# Patient Record
Sex: FEMALE
[De-identification: names, ages, dates, MRNs, and addresses within clinical notes are randomized per-mention and may not be internally consistent; named-entity substitution may affect disease eponyms.]

---

## 2018-12-26 LAB
ALBUMIN SERPL-MCNC: 3.8 G/DL (ref 3.5–5)
ALBUMIN/GLOB SERPL: 1.1 {RATIO} (ref 0.8–2)
ALP SERPL-CCNC: 82 IU/L (ref 40–150)
ALT SERPL-CCNC: 13 IU/L (ref 0–55)
ANION GAP SERPL CALC-SCNC: 14 MMOL/L (ref 8–16)
BASOPHILS # BLD AUTO: 0.1 10*3/UL (ref 0–0.1)
BASOPHILS NFR BLD AUTO: 1 % (ref 0–1)
BUN SERPL-MCNC: 11 MG/DL (ref 7–26)
BUN/CREAT SERPL: 14 (ref 6–25)
CALCIUM SERPL-MCNC: 9.6 MG/DL (ref 8.4–10.2)
CHLORIDE SERPL-SCNC: 100 MMOL/L (ref 98–107)
CO2 SERPL-SCNC: 29 MMOL/L (ref 22–29)
DEPRECATED NEUTROPHILS # BLD AUTO: 4.1 10*3/UL (ref 2.1–6.9)
EGFRCR SERPLBLD CKD-EPI 2021: > 60 ML/MIN (ref 60–?)
EOSINOPHIL # BLD AUTO: 0.1 10*3/UL (ref 0–0.4)
EOSINOPHIL NFR BLD AUTO: 2 % (ref 0–6)
ERYTHROCYTE [DISTWIDTH] IN CORD BLOOD: 12.9 % (ref 11.7–14.4)
GLOBULIN PLAS-MCNC: 3.6 G/DL (ref 2.3–3.5)
GLUCOSE SERPLBLD-MCNC: 104 MG/DL (ref 74–118)
HCT VFR BLD AUTO: 41.4 % (ref 34.2–44.1)
HGB BLD-MCNC: 13.3 G/DL (ref 12–16)
LYMPHOCYTES # BLD: 2.1 10*3/UL (ref 1–3.2)
LYMPHOCYTES NFR BLD AUTO: 30 % (ref 18–39.1)
MCH RBC QN AUTO: 29.3 PG (ref 28–32)
MCHC RBC AUTO-ENTMCNC: 32.1 G/DL (ref 31–35)
MCV RBC AUTO: 91.2 FL (ref 81–99)
MONOCYTES # BLD AUTO: 0.6 10*3/UL (ref 0.2–0.8)
MONOCYTES NFR BLD AUTO: 8.8 % (ref 4.4–11.3)
NEUTS SEG NFR BLD AUTO: 58.1 % (ref 38.7–80)
PLATELET # BLD AUTO: 310 X10E3/UL (ref 140–360)
POTASSIUM SERPL-SCNC: 4 MMOL/L (ref 3.5–5.1)
RBC # BLD AUTO: 4.54 X10E6/UL (ref 3.6–5.1)
SODIUM SERPL-SCNC: 139 MMOL/L (ref 136–145)

## 2018-12-28 ENCOUNTER — HOSPITAL ENCOUNTER (OUTPATIENT)
Dept: HOSPITAL 88 - OR | Age: 78
Discharge: HOME | End: 2018-12-28
Attending: OBSTETRICS & GYNECOLOGY
Payer: MEDICARE

## 2018-12-28 VITALS — SYSTOLIC BLOOD PRESSURE: 132 MMHG | DIASTOLIC BLOOD PRESSURE: 72 MMHG

## 2018-12-28 DIAGNOSIS — Z88.6: ICD-10-CM

## 2018-12-28 DIAGNOSIS — I69.398: ICD-10-CM

## 2018-12-28 DIAGNOSIS — Z79.82: ICD-10-CM

## 2018-12-28 DIAGNOSIS — Z88.1: ICD-10-CM

## 2018-12-28 DIAGNOSIS — R20.0: ICD-10-CM

## 2018-12-28 DIAGNOSIS — N81.4: Primary | ICD-10-CM

## 2018-12-28 DIAGNOSIS — N95.2: ICD-10-CM

## 2018-12-28 DIAGNOSIS — Z01.812: ICD-10-CM

## 2018-12-28 DIAGNOSIS — J44.9: ICD-10-CM

## 2018-12-28 DIAGNOSIS — F17.210: ICD-10-CM

## 2018-12-28 DIAGNOSIS — I10: ICD-10-CM

## 2018-12-28 DIAGNOSIS — E78.2: ICD-10-CM

## 2018-12-28 DIAGNOSIS — R94.31: ICD-10-CM

## 2018-12-28 PROCEDURE — 80053 COMPREHEN METABOLIC PANEL: CPT

## 2018-12-28 PROCEDURE — 85025 COMPLETE CBC W/AUTO DIFF WBC: CPT

## 2018-12-28 PROCEDURE — 36415 COLL VENOUS BLD VENIPUNCTURE: CPT

## 2018-12-28 NOTE — OPERATIVE REPORT
DATE OF PROCEDURE:  December 28, 2018 

 

PREOPERATIVE DIAGNOSIS:  Pelvic organ prolapse. 



POSTOPERATIVE DIAGNOSIS:  Pelvic organ prolapse.



PROCEDURE:  Anterior repair and sacrospinous colpopexy.



COMPLICATIONS:  None.



ESTIMATED BLOOD LOSS:  20 mL.



The patient was taken to the OR.  General anesthesia was placed.  She was 

prepped and draped in the normal sterile fashion.  Placed in the dorsal 

lithotomy position.  Weighted speculum was placed inside the vagina.  

Vaginal vault was held with 2 Allis clamps.  The anterior wall of the 

vagina was injected with Marcaine with epinephrine subcutaneously using 

Marcaine with epinephrine subvaginally.  Following this, a vaginal skin 

incision was made with the scalpel between the 2 Allis clamps close to the 

vault.  The vagina was dissected off the bladder using the Metzenbaum 

scissors using the _______ technique.  The vagina was opened in the midline 

using the same instruments.  Two flaps of the vagina was dissected off the 

bladder using both sharp and blunt dissection.  Following this, pelvic 

fascia was pierced with an index finger.  The sacrospinous ligament was 

palpated.  Vicryl Yue's needle was inserted into the sacrospinous 

ligament on both sides of the pelvis.  The other end was stitched to the 

vaginal vault.  The pubocervical ligaments on each side were approximated 

using Vicryl 2-0.  Excess vaginal skin was trimmed off using curved Fried 

scissors.  The vagina was closed with interlocking stitches of Vicryl 0.  

Vaginal pack and Goncalves catheter was placed inside the bladder and revealed 

clear 

urine.  The patient tolerated the procedure well.  Lap and instrument 

counts were correct times 3 at the end of the procedure.    









DD:  12/28/2018 09:05

DT:  12/28/2018 09:19

Job#:  [id number]

## 2018-12-30 ENCOUNTER — HOSPITAL ENCOUNTER (EMERGENCY)
Dept: HOSPITAL 88 - ER | Age: 78
LOS: 1 days | Discharge: HOME | End: 2018-12-31
Payer: MEDICARE

## 2018-12-30 VITALS — HEIGHT: 62 IN | BODY MASS INDEX: 29.44 KG/M2 | WEIGHT: 160 LBS

## 2018-12-30 DIAGNOSIS — N30.91: ICD-10-CM

## 2018-12-30 DIAGNOSIS — M54.5: ICD-10-CM

## 2018-12-30 DIAGNOSIS — R30.0: Primary | ICD-10-CM

## 2018-12-30 DIAGNOSIS — R10.30: ICD-10-CM

## 2018-12-30 DIAGNOSIS — K59.00: ICD-10-CM

## 2018-12-30 LAB
BACTERIA URNS QL MICRO: (no result) /HPF
BILIRUB UR QL: NEGATIVE
CLARITY UR: (no result)
COLOR UR: YELLOW
EPI CELLS URNS QL MICRO: (no result) /LPF
KETONES UR QL STRIP.AUTO: NEGATIVE
LEUKOCYTE ESTERASE UR QL STRIP.AUTO: (no result)
NITRITE UR QL STRIP.AUTO: NEGATIVE
PROT UR QL STRIP.AUTO: NEGATIVE
SP GR UR STRIP: 1.01 (ref 1.01–1.02)
UROBILINOGEN UR STRIP-MCNC: 0.2 MG/DL (ref 0.2–1)

## 2018-12-30 PROCEDURE — 99284 EMERGENCY DEPT VISIT MOD MDM: CPT

## 2018-12-30 PROCEDURE — 87086 URINE CULTURE/COLONY COUNT: CPT

## 2018-12-30 PROCEDURE — 81001 URINALYSIS AUTO W/SCOPE: CPT

## 2018-12-30 PROCEDURE — 74022 RADEX COMPL AQT ABD SERIES: CPT

## 2018-12-30 NOTE — DIAGNOSTIC IMAGING REPORT
EXAM: ABDOMEN ACUTE SERIES W/PA CXR

INDICATION: Constipation, bladder sling placed December 20, 2018, low black

pain

COMPARISON: None



FINDINGS:

LINES/TUBES: None



LUNGS: No consolidations or edema. 



PLEURA: No effusions or pneumothorax.



HEART AND MEDIASTINUM: Normal size and contour.



BOWEL PATTERN: Large amount of retained stool in the right colon. No

obstruction.



BONES AND SOFT TISSUES: No acute bone findings. Round 1.5 cm calcification

projects over the left kidney. Lower lumbar sacral spine surgical hardware.

Rounded soft tissue density in the pelvis, likely distended bladder.



IMPRESSION:

No acute thoracic abnormality.



Large amount of retained stool in the right colon without evidence for

obstruction.



Rounded soft tissue density in the pelvis is likely a distended bladder.



Possible left nephrolithiasis.





Signed by: Dr. Akila Young M.D. on 12/30/2018 8:23 PM

## 2020-04-27 ENCOUNTER — HOSPITAL ENCOUNTER (EMERGENCY)
Dept: HOSPITAL 88 - ER | Age: 80
LOS: 1 days | Discharge: HOME | End: 2020-04-28
Payer: MEDICARE

## 2020-04-27 VITALS — HEIGHT: 62 IN | WEIGHT: 160 LBS | BODY MASS INDEX: 29.44 KG/M2
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
## 2020-04-27 DIAGNOSIS — S01.511A: Primary | ICD-10-CM

## 2020-04-27 DIAGNOSIS — S61.411A: ICD-10-CM
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
## 2020-04-27 DIAGNOSIS — E78.5: ICD-10-CM

## 2020-04-27 DIAGNOSIS — S80.01XA: ICD-10-CM

## 2020-04-27 DIAGNOSIS — S80.02XA: ICD-10-CM

## 2020-04-27 DIAGNOSIS — I10: ICD-10-CM
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
## 2020-04-27 PROCEDURE — 99283 EMERGENCY DEPT VISIT LOW MDM: CPT

## 2020-04-27 PROCEDURE — 70450 CT HEAD/BRAIN W/O DYE: CPT

## 2020-04-27 PROCEDURE — 72125 CT NECK SPINE W/O DYE: CPT

## 2020-04-27 PROCEDURE — 70486 CT MAXILLOFACIAL W/O DYE: CPT

## 2020-04-28 NOTE — DIAGNOSTIC IMAGING REPORT
History: Fall.



Comparison studies: None



Technique: 

Axial images were obtained through the cervical region..

Coronal and sagittal images reconstructed from the axial data.

Dose modulation, iterative reconstruction, and/or weight based adjustment of

the mA/kV was utilized to reduce the radiation dose to as low as reasonably

achievable.



Intravenous contrast: None



Findings:



Fractures: None.

Soft tissue injuries: None.



Atlantoaxial articulation: Intact.

Alignment: Loss of normal cervical lordosis is either positional or due to

muscle spasm.. No scoliosis.

Cervicomedullary junction: No abnormalities. The foramen magnum is patent.

Soft tissues: No abnormalities. 



Vertebrae: 

No fractures, infection or neoplasm.



Degenerative changes: 

C2-C3: Left facet and uncovertebral arthrosis without significant foraminal

stenosis.

C3-C4: Posterior disc osteophyte complex without significant canal stenosis.

Mild left foraminal stenosis due to facet and uncovertebral arthrosis.

C4-C5: Mild right foraminal stenosis due to facet and uncovertebral arthrosis.

C5-C6: Posterior disc osteophyte complex without canal stenosis.

Moderate right and mild left foraminal stenosis due to facet and uncovertebral

arthrosis.

C6-C7: Mild bilateral foraminal stenosis due to facet and uncovertebral

arthrosis..



IMPRESSION:



1.  No acute cervical spine fracture or dislocation. Loss of normal cervical

lordosis is either positional or due to muscle spasm.



2.  Ligament, spinal cord and or vascular abnormalities cannot be excluded on

the basis of this examination.



3.  Cervical spondylosis as detailed above.







Signed by: Dr. Freida Marin M.D. on 4/28/2020 1:07 AM

## 2020-04-28 NOTE — DIAGNOSTIC IMAGING REPORT
HUMERUS LEFT 2+VIEWS - 3 views



HISTORY:  Pain.    

COMPARISON: None available.

     

FINDINGS:

Bones:

No acute displaced fracture.  

Osseous alignment is within normal limits.



Joints:

Unremarkable.



Soft tissues:

The soft tissues appear unremarkable.





IMPRESSION: 

No acute radiographic abnormality.



Signed by: Fabian Knight MD on 4/28/2020 1:29 AM

## 2020-04-28 NOTE — DIAGNOSTIC IMAGING REPORT
KNEE RIGHT THREE VIEWS - 3 views



HISTORY:  Pain.    

COMPARISON: None available.

     

FINDINGS:

Bones:

No acute displaced fracture.  

Osseous alignment is within normal limits.



Joints:

Moderate tricompartmental degenerative changes, worst in the medial

compartment. Subtle increased density of the joint space is suggestive of

chondrocalcinosis.



Soft tissues:

No joint effusion. Vascular calcifications.





IMPRESSION: 

No acute radiographic abnormality.



Signed by: Fabian Knight MD on 4/28/2020 1:31 AM

## 2020-04-28 NOTE — DIAGNOSTIC IMAGING REPORT
EXAMINATION: Head CT without contrast.  





HISTORY:Fall. 



COMPARISON:None.

TECHNIQUE: Multidetector axial images were obtained from the foramen magnum to

the vertex without contrast. The images were reconstructed using brain and bone

algorithms.  Thin section brain images were reformatted into coronal and

sagittal planes.

Dose modulation, iterative reconstruction, and/or weight based adjustment of

the mA/kV was utilized to reduce the radiation dose to as low as reasonably

achievable.



Intravenous contrast: None  



IMAGE QUALITY: Suboptimal evaluation particularly at the level of skull base

and posterior fossa structures due to streak artifacts.



FINDINGS:

    Skull/scalp: No lytic or blastic. lesions.  No surgical changes.

    Parenchyma: Nonspecific few, scattered supratentorial white matter

hypodensity are likely related to small vessel ischemic changes.

Focal hypodensity in the posterior aspect of right lentiform nucleus represents

prominent perivascular space.

No acute hemorrhage, mass or acute major vascular territorial infarct.

No acute hemorrhage, mass or acute major vascular territorial infarct.

    Arteries: No density suggestive of thrombosis.   

    Dural sinuses: No abnormal density suggestive of thrombosis. 

    Ventricles: Mild compensated dilatation due to volume loss. No

hydrocephalus.

    Extra-axial spaces: No abnormal density.  

    Brain volume: Normal for age.

    Craniocervical junction: No mass, Chiari malformation, or basilar

invagination.

    Sella: No mass. 

    Paranasal/mastoid sinuses: Imaged portions unremarkable.





IMPRESSION:

No acute intracranial abnormality.



Mild supratentorial white matter microvascular ischemic changes.



Signed by: Dr. Freida Marin M.D. on 4/28/2020 1:03 AM

## 2020-04-28 NOTE — XMS REPORT
Patient Summary Document

                             Created on: 2020



JESSICA EUGENE

External Reference #: [id number]

: 1940

Sex: Female



Demographics







                          Address                   [id number]

[place]  [address]

 

                          Home Phone                [phone]

 

                          Preferred Language        Unknown

 

                          Marital Status            Unknown

 

                          [employer] Affiliation     Unknown

 

                          Race                      Unknown

 

                          Additional Race(s)        Other



 

                          Ethnic Group              Unknown





Author







                          Author                    [facility]

 

                          [facility]

 

                          Address                   Unknown

 

                          Phone                     Unavailable







Support







                Name            Relationship    Address         Phone

 

                    RODERICK WASHINGTON,  MICHELLE    Caregiver           [address]  [phone]

 

                    RODERICK WASHINGTON,  MICHELLE    Caregiver           [address]  [address] [phone]

 

                    SAMARIA ALLAN MD    Caregiver           [address]

[place]  [address]                 Unavailable

 

                    DWAYNE DICKERSON      Next Of Kin         [address] [place]  [address] [phone]







Care Team Providers







                    Care Team Member Name    Role                Phone

 

                    MICHELLE VAUGHN MD    PP                  [phone]

 

                    SAMARIA ALLAN    Unavailable         Unavailable







Payers







             Payer Name    Policy Type    Policy Number    Effective Date    Expiration Date

 

             [employer]                 [id number]    2013 00:00:00     







Problems







           Condition Name    Condition Details    Condition Category    Status     Onset Date    Resolution

 Date               Last Treatment Date    Treating Clinician    Comments

 

                Supraventricular tachycardia    SVT (supraventricular tachycardia)    Problem         Active

             2015 00:00:00                                            







Allergies, Adverse Reactions, Alerts







          Allergy Name    Allergy Type    Status    Severity    Reaction(s)    Onset Date    Inactive 

Date                      Treating Clinician        Comments

 

           Hydrocodone    Allergy to Substance    Active     Unknown    vomiting    2018 00:00:00

                                                             

 

           Ciprofloxacin    Allergy to Substance    Active     Unknown    Vomiting    2018 00:00:00

                                                             

 

          Tramadol    Allergy to Substance    Active    Unknown    Vomiting    2018 00:00:00    

                                                     







Medications







             Ordered Medication Name    Filled Medication Name    Start Date    Stop Date    Current 

Medication?    Ordering Clinician    Indication    Dosage     Frequency    Signature (SIG)    

Comments                                Components

 

                          Albuterol Sulfate (Ventolin Hfa) 18 Gm Hfa.aer.ad    Albuterol Sulfate (Ventolin Hfa)

 18 Gm Hfa.aer.ad                  Yes                  90            Three Times A Day            

 

           Amlodipine Besylate 10 Mg Tablet    Amlodipine Besylate 10 Mg Tablet                          Yes         

                      10                    Daily                  

 

             Aspirin (Aspir 81) 81 Mg Tablet.    Aspirin (Aspir 81) 81 Mg Tablet.dr                              Yes

                              81                  Daily                

 

          Ezetimibe (Zetia) 10 Mg Tablet    Ezetimibe (Zetia) 10 Mg Tablet                        Yes                   

             10                        Daily                      

 

           Hydrochlorothiazide 25 Mg Tablet    Hydrochlorothiazide 25 Mg Tablet                          Yes         

                      25                    Daily                  

 

                          Ipratropium Bromide (Atrovent Hfa) 12.9 Gm Hfa.aer.ad    Ipratropium Bromide (Atrovent

 Hfa) 12.9 Gm Hfa.aer.ad                  Yes                  12.9           Every 6 Hours            

 

       Lisinopril 10 Mg Tablet    Lisinopril 10 Mg Tablet                  Yes                  20            Daily 

                                                     

 

                          Pantoprazole Sodium (Protonix) 40 Mg Tablet.    Pantoprazole Sodium (Protonix) 40

 Mg Tablet.                  Yes                  40            Daily            

 

       Simvastatin 80 Mg Tablet    Simvastatin 80 Mg Tablet                  Yes                  80            Daily

                                                     

 

                          Tiotropium Bromide (Spiriva) 18 Mcg Cap.w.dev    Tiotropium Bromide (Spiriva) 18 Mcg

 Cap.w.dev                  Yes                  18            Daily            

 

      Wal-Phed    Wal-Phed                Yes                            As Needed           

 

                          Metoprolol Tartrate 25 Mg Tablet, 25 Mg Oral    Metoprolol Tartrate 25 Mg Tablet, 

25 Mg Oral           2018 00:00:00    No                   25            Twice A Day            

 

                Lovastatin 20 Mg Tablet, 20 Mg Oral    Lovastatin 20 Mg Tablet, 20 Mg Oral                    2018

 00:00:00    No                      20              Daily             







Procedures and Interventions







                    Procedure           Date / Time Performed    Performing Clinician

 

                    Combined anteroposterior colporrhaphy    2018 00:00:00    LANDEN SHERIDAN







Encounters







             Start Date/Time    End Date/Time    Encounter Type    Admission Type    Attending Clinicians

                    Care Facility       Care Department     Encounter ID

 

             2018 19:03:00    2018 19:03:00    Registered Emergency Room    1            SANJANA 

BALWINDER              [facility]              [id number]

 

           2018 05:23:00    2018 05:23:00    Registered Surgical Day Care                          [facility]                    [id number]







Results







           Test Description    Test Time    Test Comments    Text Results    Atomic Results    Result

 Comments

 

                Urine Color     2018 21:45:00                      

 

   

 

                Urine Color (test code=5778-6)    YELLOW          YELLOW           





Urine Lrynjdp9585-34-82 21:45:00* 





                Test Item       Value           Reference Range    Comments

 

                Urine Clarity (test code=32167-9)    HAZY            CLEAR            





Urine Specific Aesazpe0897-45-85 21:45:00* 





                Test Item       Value           Reference Range    Comments

 

                Urine Specific Gravity (test code=5811-5)    1.015           1.010-1.025      





Urine aU2225-48-34 21:45:00* 





                Test Item       Value           Reference Range    Comments

 

                Urine pH (test code=50560-2)    6.5             5-7              





Urine Leukocyte Hynehjwc4418-99-48 21:45:00* 





                Test Item       Value           Reference Range    Comments

 

                Urine Leukocyte Esterase (test code=5799-2)    1+              NEGATIVE         





Urine Xkscjyb9480-11-39 21:45:00* 





                Test Item       Value           Reference Range    Comments

 

                Urine Nitrite (test code=32710-6)    NEGATIVE        NEGATIVE         





Urine Mwdpnip5729-48-24 21:45:00* 





                Test Item       Value           Reference Range    Comments

 

                Urine Protein (test code=5804-0)    NEGATIVE        NEGATIVE         





Urine Glucose (UA)2018 21:45:00* 





                Test Item       Value           Reference Range    Comments

 

                Urine Glucose (UA) (test code=2349-9)    NEGATIVE        NEGATIVE         





Urine Mdepuhp6584-48-15 21:45:00* 





                Test Item       Value           Reference Range    Comments

 

                Urine Ketones (test code=57734-6)    NEGATIVE        NEGATIVE         





Urine Jjanxphsldmk0052-44-55 21:45:00* 





                Test Item       Value           Reference Range    Comments

 

                Urine Urobilinogen (test code=20405-7)    0.2             0.2-1            





Urine Yryxslqby1564-93-36 21:45:00* 





                Test Item       Value           Reference Range    Comments

 

                Urine Bilirubin (test code=1978-6)    NEGATIVE        NEGATIVE         





Urine Wlcgh0800-93-70 21:45:00* 





                Test Item       Value           Reference Range    Comments

 

                Urine Blood (test code=33051-4)    3+              NEGATIVE         





Urine UIO8599-87-20 21:45:00* 





                Test Item       Value           Reference Range    Comments

 

                Urine WBC (test code=5821-4)    11-20           0-5              





Urine MOT3754-25-90 21:45:00* 





                Test Item       Value           Reference Range    Comments

 

                Urine RBC (test kbme=59877-8)    11-20           0-5              





Urine Rziefcyq9295-35-46 21:45:00* 





                Test Item       Value           Reference Range    Comments

 

                Urine Bacteria (test code=25145-4)    FEW             NONE             





Urine Epithelial Psduf6081-36-83 21:45:00* 





                Test Item       Value           Reference Range    Comments

 

                Urine Epithelial Cells (test code=20453-7)    MODERATE        NONE             





ABDOMEN ACUTE SERIES W/PA YUE3873-79-46 20:20:00                                
                                                     [facility]                        [address] [address]      Patient Name: JESSICA EUGENE                                   
MR #: [id number]                     : 1940                            
      Age/Sex: 78/F  Acct #: [id number]                              Req #: 
[id number]  Adm Physician:                                                      
Ordered by: BALWINDER ALLAN MD                            Report #: [id number]  
     Location: ER                                      Room/Bed:                
    ______________________________________________
_____________________________________________________    Procedure: [id number] DX
/ABDOMEN ACUTE SERIES W/PA CXR  Exam Date:                             Exam Time
:                                               REPORT STATUS: Signed    EXAM: A
BDOMEN ACUTE SERIES W/PA CXR   INDICATION: Constipation, bladder sling placed De
cember 2018, low black   pain   COMPARISON: None      FINDINGS:   LINES/TUBE
S: None      LUNGS: No consolidations or edema.       PLEURA: No effusions or pn
eumothorax.      HEART AND MEDIASTINUM: Normal size and contour.      BOWEL GEM
MARGA: Large amount of retained stool in the right colon. No   obstruction.      B
ONES AND SOFT TISSUES: No acute bone findings. Round 1.5 cm calcification   proj
ects over the left kidney. Lower lumbar sacral spine surgical hardware.   Rounde
d soft tissue density in the pelvis, likely distended bladder.      IMPRESSION: 
 No acute thoracic abnormality.      Large amount of retained stool in the right
colon without evidence for   obstruction.      Rounded soft tissue density in 
the pelvis is likely a distended bladder.      Possible left nephrolithiasis.   
     Signed by: Dr. Ángela Young M.D. on 2018 8:23 PM        Dictat
ed By: ÁNGELA YOUNG MD  Electronically Signed By: ÁNGELA YOUNG MD on 18  Transcribed By: MARISSA on 18       COPY TO:   BALWINDER ALLAN MD        Sodium Rkftz2875-83-55 11:51:00* 





                Test Item       Value           Reference Range    Comments

 

                Sodium Level (test code=2951-2)    139             136-145          





Potassium Mqedg0081-89-37 11:51:00* 





                Test Item       Value           Reference Range    Comments

 

                Potassium Level (test code=2823-3)    4.0             3.5-5.1          





Chloride Zhgjh8050-54-73 11:51:00* 





                Test Item       Value           Reference Range    Comments

 

                Chloride Level (test code=2075-0)    100                        





Carbon Dioxide Exxpo4759-52-96 11:51:00* 





                Test Item       Value           Reference Range    Comments

 

                Carbon Dioxide Level (test code=8-9)    29              22-29            





Anion Sug1166-90-07 11:51:00* 





                Test Item       Value           Reference Range    Comments

 

                Anion Gap (test code=33037-3)    14.0            8-16             





Blood Urea Wednslau1458-22-05 11:51:00* 





                Test Item       Value           Reference Range    Comments

 

                Blood Urea Nitrogen (test code=3094-0)    11              7-26             





Gdgjtqqasu9066-64-13 11:51:00* 





                Test Item       Value           Reference Range    Comments

 

                Creatinine (test code=2160-0)    0.77            0.57-1.11        





BUN/Creatinine Vtzhi9732-28-18 11:51:00* 





                Test Item       Value           Reference Range    Comments

 

                BUN/Creatinine Ratio (test code=3097-3)    14              6-25             





Estimat Glomerular Filtration Wlaz9179-34-18 11:51:00* 





                Test Item       Value           Reference Range    Comments

 

                Estimat Glomerular Filtration Rate (test code=444336003)    > 60            >60              





Ranges were taken from the National Kidney Disease Education Program and the [facility] literature.Reference ranges:60 or greater: Olvrnm64-62 (
for 3 consecutive months): Chronic kidney disease 15 or less: Kidney failure
Glucose Gkujm2259-00-45 11:51:00* 





                Test Item       Value           Reference Range    Comments

 

                Glucose Level (test code=IMO0002)    104                        





Calcium Jmign7468-70-10 11:51:00* 





                Test Item       Value           Reference Range    Comments

 

                Calcium Level (test code=17861-6)    9.6             8.4-10.2         





Total Obttsooin0032-25-96 11:51:00* 





                Test Item       Value           Reference Range    Comments

 

                Total Bilirubin (test code=1975-2)    0.6             0.2-1.2          





Aspartate Amino Transf (AST/SGOT)2018 11:51:00* 





                Test Item       Value           Reference Range    Comments

 

                                        Aspartate Amino Transf (AST/SGOT) (test code=Aspartate Amino Transf (AST/SGOT)) 

                    15                  5-34                 





Alanine Aminotransferase (ALT/SGPT)2018 11:51:00* 





                Test Item       Value           Reference Range    Comments

 

                Alanine Aminotransferase (ALT/SGPT) (test code=1742-6)    13              0-55             





Total Cujmcvh2178-09-88 11:51:00* 





                Test Item       Value           Reference Range    Comments

 

                Total Protein (test code=2885-2)    7.4             6.5-8.1          





Utozqwl9649-49-61 11:51:00* 





                Test Item       Value           Reference Range    Comments

 

                Albumin (test code=1751-7)    3.8             3.5-5.0          





Bqpayzdx3831-02-24 11:51:00* 





                Test Item       Value           Reference Range    Comments

 

                Globulin (test code=13536-8)    3.6             2.3-3.5          





Albumin/Globulin Sqwln9342-27-92 11:51:00* 





                Test Item       Value           Reference Range    Comments

 

                Albumin/Globulin Ratio (test code=1759-0)    1.1             0.8-2.0          





Alkaline Xlkhvbuyngg5323-41-56 11:51:00* 





                Test Item       Value           Reference Range    Comments

 

                Alkaline Phosphatase (test code=6768-6)    82                         





White Blood Comhd3229-95-07 11:27:00* 





                Test Item       Value           Reference Range    Comments

 

                White Blood Count (test code=6690-2)    7.01            4.8-10.8         





Red Blood Lxzmg2699-83-00 11:27:00* 





                Test Item       Value           Reference Range    Comments

 

                Red Blood Count (test code=789-8)    4.54            3.6-5.1          





Yilowjkmji0608-97-67 11:27:00* 





                Test Item       Value           Reference Range    Comments

 

                Hemoglobin (test code=59260-0)    13.3            12.0-16.0        





Pfnudgparj9286-56-21 11:27:00* 





                Test Item       Value           Reference Range    Comments

 

                Hematocrit (test code=4544-3)    41.4            34.2-44.1        





Mean Corpuscular Twqwnt5621-15-73 11:27:00* 





                Test Item       Value           Reference Range    Comments

 

                Mean Corpuscular Volume (test code=787-2)    91.2            81-99            





Mean Corpuscular Mbataxwjpc3708-31-37 11:27:00* 





                Test Item       Value           Reference Range    Comments

 

                Mean Corpuscular Hemoglobin (test code=785-6)    29.3            28-32            





Mean Corpuscular Hemoglobin Glqvtko0310-55-11 11:27:00* 





                Test Item       Value           Reference Range    Comments

 

                Mean Corpuscular Hemoglobin Concent (test code=786-4)    32.1            31-35            





Red Cell Distribution Ulssp8662-74-17 11:27:00* 





                Test Item       Value           Reference Range    Comments

 

                Red Cell Distribution Width (test code=47277-9)    12.9            11.7-14.4        





Platelet Jjjsv1415-99-04 11:27:00* 





                Test Item       Value           Reference Range    Comments

 

                Platelet Count (test code=777-3)    310             140-360          





Neutrophils (%) (Auto)2018 11:27:00* 





                Test Item       Value           Reference Range    Comments

 

                Neutrophils (%) (Auto) (test code=32200-8)    58.1            38.7-80.0        





Lymphocytes (%) (Auto)2018 11:27:00* 





                Test Item       Value           Reference Range    Comments

 

                Lymphocytes (%) (Auto) (test code=736-9)    30.0            18.0-39.1        





Monocytes (%) (Auto)2018 11:27:00* 





                Test Item       Value           Reference Range    Comments

 

                Monocytes (%) (Auto) (test code=5905-5)    8.8             4.4-11.3         





Eosinophils (%) (Auto)2018 11:27:00* 





                Test Item       Value           Reference Range    Comments

 

                Eosinophils (%) (Auto) (test code=713-8)    2.0             0.0-6.0          





Basophils (%) (Auto)2018 11:27:00* 





                Test Item       Value           Reference Range    Comments

 

                Basophils (%) (Auto) (test code=706-2)    1.0             0.0-1.0          





IM GRANULOCYTES %2018 11:27:00* 





                Test Item       Value           Reference Range    Comments

 

                IM GRANULOCYTES % (test code=IM GRANULOCYTES %)    0.1             0.0-1.0          





Neutrophils # (Auto)2018 11:27:00* 





                Test Item       Value           Reference Range    Comments

 

                Neutrophils # (Auto) (test code=751-8)    4.1             2.1-6.9          





Lymphocytes # (Auto)2018 11:27:00* 





                Test Item       Value           Reference Range    Comments

 

                Lymphocytes # (Auto) (test code=26474-7)    2.1             1.0-3.2          





Monocytes # (Auto)2018 11:27:00* 





                Test Item       Value           Reference Range    Comments

 

                Monocytes # (Auto) (test code=742-7)    0.6             0.2-0.8          





Eosinophils # (Auto)2018 11:27:00* 





                Test Item       Value           Reference Range    Comments

 

                Eosinophils # (Auto) (test code=711-2)    0.1             0.0-0.4          





Basophils # (Auto)2018 11:27:00* 





                Test Item       Value           Reference Range    Comments

 

                Basophils # (Auto) (test code=704-7)    0.1             0.0-0.1          





Absolute Immature Granulocyte (tzey3891-47-71 11:27:00* 





                Test Item       Value           Reference Range    Comments

 

                                        Absolute Immature Granulocyte (auto (test code=Absolute Immature Granulocyte (auto)

                    0.01                0-0.1

## 2020-04-28 NOTE — DIAGNOSTIC IMAGING REPORT
History:Fall. 



Comparison studies: None



Technique:

Axial images were obtained through the maxillofacial region.

Coronal and sagittal images reconstructed from the axial data.

Dose modulation, iterative reconstruction, and/or weight based adjustment of

the mA/kV was utilized to reduce the radiation dose to as low as reasonably

achievable.

Intravenous contrast: None



Findings:



Soft tissues: 

No abnormalities.



Bones: 

No fractures.

Incidental age indeterminate mild anterior subluxation of left mandibular

condyle. Mild degenerative changes and flattened appearance of left mandibular

condyle.



Orbits: 

Globes: Intact

Extra or intraconal abnormalities: None.



Paranasal sinuses:

 Clear



IMPRESSION: 



No acute fracture.



Age indeterminate mild anterior subluxation of left mandibular condyle.



Signed by: Dr. Freida Marin M.D. on 4/28/2020 1:12 AM

## 2020-04-28 NOTE — DIAGNOSTIC IMAGING REPORT
SHOULDER LEFT COMPLETE - 3 views



HISTORY:  Pain.    

COMPARISON: None available.

     

FINDINGS:

Bones:

No acute displaced fracture.  

Osseous alignment is within normal limits.



Joints:

Mild degenerative changes of the left acromioclavicular joint.



Soft tissues:

The soft tissues appear unremarkable.





IMPRESSION: 

No acute radiographic abnormality.



Signed by: Fabian Knight MD on 4/28/2020 1:28 AM

## 2020-04-28 NOTE — DIAGNOSTIC IMAGING REPORT
KNEE LEFT THREE VIEWS - 3 views



HISTORY:  Pain.    

COMPARISON: None available.

     

FINDINGS:

Bones:

No acute displaced fracture.  

Osseous alignment is within normal limits.



Joints:

Moderate tricompartmental degenerative changes, worst in the medial

compartment. Subtle increased density of the joint space is suggestive of

chondrocalcinosis.



Soft tissues:

No joint effusion. Vascular calcifications.





IMPRESSION: 

Moderate left knee degenerative change. No acute osseous abnormality.



Signed by: Fabian Knight MD on 4/28/2020 1:30 AM

## 2020-04-28 NOTE — DIAGNOSTIC IMAGING REPORT
HAND RIGHT 2 VIEWS - 3 views



HISTORY:  Pain.    

COMPARISON: None available.

     

FINDINGS:

Bones:

No acute displaced fracture.  

Osseous alignment is within normal limits.



Joints:

Mild degenerative changes of the interphalangeal joints. Advanced arthrosis of

the distal radioulnar joint.



Soft tissues:

The soft tissues appear unremarkable.





IMPRESSION: 

No acute radiographic abnormality.



Signed by: Fabian Knight MD on 4/28/2020 1:27 AM

## 2020-11-30 LAB
ALBUMIN SERPL-MCNC: 3.7 G/DL (ref 3.5–5)
ALBUMIN/GLOB SERPL: 1 {RATIO} (ref 0.8–2)
ALP SERPL-CCNC: 87 IU/L (ref 40–150)
ALT SERPL-CCNC: 9 IU/L (ref 0–55)
ANION GAP SERPL CALC-SCNC: 12.8 MMOL/L (ref 8–16)
BASOPHILS # BLD AUTO: 0.1 10*3/UL (ref 0–0.1)
BASOPHILS NFR BLD AUTO: 1 % (ref 0–1)
BUN SERPL-MCNC: 16 MG/DL (ref 7–26)
BUN/CREAT SERPL: 19 (ref 6–25)
CALCIUM SERPL-MCNC: 9.4 MG/DL (ref 8.4–10.2)
CHLORIDE SERPL-SCNC: 99 MMOL/L (ref 98–107)
CO2 SERPL-SCNC: 31 MMOL/L (ref 22–29)
DEPRECATED NEUTROPHILS # BLD AUTO: 4.1 10*3/UL (ref 2.1–6.9)
EGFRCR SERPLBLD CKD-EPI 2021: > 60 ML/MIN (ref 60–?)
EOSINOPHIL # BLD AUTO: 0.1 10*3/UL (ref 0–0.4)
EOSINOPHIL NFR BLD AUTO: 1.3 % (ref 0–6)
ERYTHROCYTE [DISTWIDTH] IN CORD BLOOD: 14.6 % (ref 11.7–14.4)
GLOBULIN PLAS-MCNC: 3.6 G/DL (ref 2.3–3.5)
GLUCOSE SERPLBLD-MCNC: 122 MG/DL (ref 74–118)
HCT VFR BLD AUTO: 37.1 % (ref 34.2–44.1)
HGB BLD-MCNC: 11.4 G/DL (ref 12–16)
LYMPHOCYTES # BLD: 2.2 10*3/UL (ref 1–3.2)
LYMPHOCYTES NFR BLD AUTO: 30.9 % (ref 18–39.1)
MCH RBC QN AUTO: 26.3 PG (ref 28–32)
MCHC RBC AUTO-ENTMCNC: 30.7 G/DL (ref 31–35)
MCV RBC AUTO: 85.7 FL (ref 81–99)
MONOCYTES # BLD AUTO: 0.6 10*3/UL (ref 0.2–0.8)
MONOCYTES NFR BLD AUTO: 8.7 % (ref 4.4–11.3)
NEUTS SEG NFR BLD AUTO: 57.7 % (ref 38.7–80)
PLATELET # BLD AUTO: 338 X10E3/UL (ref 140–360)
POTASSIUM SERPL-SCNC: 3.8 MMOL/L (ref 3.5–5.1)
RBC # BLD AUTO: 4.33 X10E6/UL (ref 3.6–5.1)
SODIUM SERPL-SCNC: 139 MMOL/L (ref 136–145)

## 2020-12-02 ENCOUNTER — HOSPITAL ENCOUNTER (OUTPATIENT)
Dept: HOSPITAL 88 - OR | Age: 80
Discharge: HOME | End: 2020-12-02
Attending: OBSTETRICS & GYNECOLOGY
Payer: MEDICARE

## 2020-12-02 VITALS — DIASTOLIC BLOOD PRESSURE: 67 MMHG | SYSTOLIC BLOOD PRESSURE: 121 MMHG

## 2020-12-02 DIAGNOSIS — N81.10: Primary | ICD-10-CM

## 2020-12-02 DIAGNOSIS — Z01.810: ICD-10-CM

## 2020-12-02 DIAGNOSIS — Z20.828: ICD-10-CM

## 2020-12-02 DIAGNOSIS — K59.00: ICD-10-CM

## 2020-12-02 DIAGNOSIS — Z79.82: ICD-10-CM

## 2020-12-02 DIAGNOSIS — Z88.5: ICD-10-CM

## 2020-12-02 DIAGNOSIS — Z86.73: ICD-10-CM

## 2020-12-02 DIAGNOSIS — N81.9: ICD-10-CM

## 2020-12-02 DIAGNOSIS — I10: ICD-10-CM

## 2020-12-02 DIAGNOSIS — Z79.84: ICD-10-CM

## 2020-12-02 DIAGNOSIS — E78.00: ICD-10-CM

## 2020-12-02 DIAGNOSIS — K21.9: ICD-10-CM

## 2020-12-02 DIAGNOSIS — Z88.1: ICD-10-CM

## 2020-12-02 DIAGNOSIS — F17.210: ICD-10-CM

## 2020-12-02 DIAGNOSIS — Z01.818: ICD-10-CM

## 2020-12-02 DIAGNOSIS — Z01.812: ICD-10-CM

## 2020-12-02 DIAGNOSIS — J45.909: ICD-10-CM

## 2020-12-02 DIAGNOSIS — E11.9: ICD-10-CM

## 2020-12-02 PROCEDURE — 36415 COLL VENOUS BLD VENIPUNCTURE: CPT

## 2020-12-02 PROCEDURE — 71046 X-RAY EXAM CHEST 2 VIEWS: CPT

## 2020-12-02 PROCEDURE — 80053 COMPREHEN METABOLIC PANEL: CPT

## 2020-12-02 PROCEDURE — 85025 COMPLETE CBC W/AUTO DIFF WBC: CPT

## 2020-12-02 PROCEDURE — 93005 ELECTROCARDIOGRAM TRACING: CPT

## 2020-12-02 PROCEDURE — 57240 ANTERIOR COLPORRHAPHY: CPT

## 2021-01-03 ENCOUNTER — HOSPITAL ENCOUNTER (OUTPATIENT)
Dept: HOSPITAL 88 - VACCPMC | Age: 81
End: 2021-01-03
Payer: COMMERCIAL

## 2021-01-03 DIAGNOSIS — Z20.822: ICD-10-CM

## 2021-01-03 DIAGNOSIS — Z23: Primary | ICD-10-CM

## 2021-02-02 ENCOUNTER — HOSPITAL ENCOUNTER (OUTPATIENT)
Dept: HOSPITAL 88 - VACCPMC | Age: 81
DRG: 951 | End: 2021-02-02
Payer: COMMERCIAL

## 2021-02-02 DIAGNOSIS — Z23: Primary | ICD-10-CM

## 2021-02-02 DIAGNOSIS — Z20.822: ICD-10-CM

## 2021-02-02 PROCEDURE — 91301: CPT

## 2021-02-02 PROCEDURE — 0012A: CPT

## 2022-03-10 ENCOUNTER — HOSPITAL ENCOUNTER (EMERGENCY)
Dept: HOSPITAL 88 - ER | Age: 82
Discharge: HOME | End: 2022-03-10
Payer: MEDICARE

## 2022-03-10 VITALS — BODY MASS INDEX: 27.6 KG/M2 | WEIGHT: 150 LBS | HEIGHT: 62 IN

## 2022-03-10 DIAGNOSIS — F17.210: ICD-10-CM

## 2022-03-10 DIAGNOSIS — R94.31: ICD-10-CM

## 2022-03-10 DIAGNOSIS — M54.9: ICD-10-CM

## 2022-03-10 DIAGNOSIS — M62.838: Primary | ICD-10-CM

## 2022-03-10 DIAGNOSIS — I10: ICD-10-CM

## 2022-03-10 DIAGNOSIS — Z20.822: ICD-10-CM

## 2022-03-10 DIAGNOSIS — E78.00: ICD-10-CM

## 2022-03-10 DIAGNOSIS — E78.5: ICD-10-CM

## 2022-03-10 LAB
ALBUMIN SERPL-MCNC: 3.5 G/DL (ref 3.5–5)
ALBUMIN/GLOB SERPL: 1 {RATIO} (ref 0.8–2)
ALP SERPL-CCNC: 72 IU/L (ref 40–150)
ALT SERPL-CCNC: 14 IU/L (ref 0–55)
ANION GAP SERPL CALC-SCNC: 12.2 MMOL/L (ref 8–16)
BASOPHILS # BLD AUTO: 0.1 10*3/UL (ref 0–0.1)
BASOPHILS NFR BLD AUTO: 0.8 % (ref 0–1)
BUN SERPL-MCNC: 9 MG/DL (ref 7–26)
BUN/CREAT SERPL: 9 (ref 6–25)
CALCIUM SERPL-MCNC: 9.3 MG/DL (ref 8.4–10.2)
CHLORIDE SERPL-SCNC: 105 MMOL/L (ref 98–107)
CK MB SERPL-MCNC: 1.2 NG/ML (ref 0–5)
CK SERPL-CCNC: 78 IU/L (ref 29–168)
CO2 SERPL-SCNC: 28 MMOL/L (ref 22–29)
DEPRECATED APTT PLAS QN: 25 SECONDS (ref 23.8–35.5)
DEPRECATED INR PLAS: 0.82
DEPRECATED NEUTROPHILS # BLD AUTO: 4.4 10*3/UL (ref 2.1–6.9)
EGFRCR SERPLBLD CKD-EPI 2021: 54 ML/MIN (ref 60–?)
EOSINOPHIL # BLD AUTO: 0.1 10*3/UL (ref 0–0.4)
EOSINOPHIL NFR BLD AUTO: 1.1 % (ref 0–6)
ERYTHROCYTE [DISTWIDTH] IN CORD BLOOD: 22.2 % (ref 11.7–14.4)
GLOBULIN PLAS-MCNC: 3.4 G/DL (ref 2.3–3.5)
GLUCOSE SERPLBLD-MCNC: 161 MG/DL (ref 74–118)
HCT VFR BLD AUTO: 36.7 % (ref 34.2–44.1)
HGB BLD-MCNC: 11.1 G/DL (ref 12–16)
LYMPHOCYTES # BLD: 1.4 10*3/UL (ref 1–3.2)
LYMPHOCYTES NFR BLD AUTO: 21.3 % (ref 18–39.1)
MCH RBC QN AUTO: 25.7 PG (ref 28–32)
MCHC RBC AUTO-ENTMCNC: 30.2 G/DL (ref 31–35)
MCV RBC AUTO: 85 FL (ref 81–99)
MONOCYTES # BLD AUTO: 0.6 10*3/UL (ref 0.2–0.8)
MONOCYTES NFR BLD AUTO: 8.9 % (ref 4.4–11.3)
NEUTS SEG NFR BLD AUTO: 67.7 % (ref 38.7–80)
PLATELET # BLD AUTO: 310 X10E3/UL (ref 140–360)
POTASSIUM SERPL-SCNC: 4.2 MMOL/L (ref 3.5–5.1)
PROTHROMBIN TIME: 12.1 SECONDS (ref 11.9–14.5)
RBC # BLD AUTO: 4.32 X10E6/UL (ref 3.6–5.1)
SODIUM SERPL-SCNC: 141 MMOL/L (ref 136–145)

## 2022-03-10 PROCEDURE — 36415 COLL VENOUS BLD VENIPUNCTURE: CPT

## 2022-03-10 PROCEDURE — 71045 X-RAY EXAM CHEST 1 VIEW: CPT

## 2022-03-10 PROCEDURE — 83880 ASSAY OF NATRIURETIC PEPTIDE: CPT

## 2022-03-10 PROCEDURE — 80053 COMPREHEN METABOLIC PANEL: CPT

## 2022-03-10 PROCEDURE — 99284 EMERGENCY DEPT VISIT MOD MDM: CPT

## 2022-03-10 PROCEDURE — 82553 CREATINE MB FRACTION: CPT

## 2022-03-10 PROCEDURE — 85025 COMPLETE CBC W/AUTO DIFF WBC: CPT

## 2022-03-10 PROCEDURE — 93005 ELECTROCARDIOGRAM TRACING: CPT

## 2022-03-10 PROCEDURE — 84484 ASSAY OF TROPONIN QUANT: CPT

## 2022-03-10 PROCEDURE — 85730 THROMBOPLASTIN TIME PARTIAL: CPT

## 2022-03-10 PROCEDURE — 82550 ASSAY OF CK (CPK): CPT

## 2022-03-10 PROCEDURE — 85610 PROTHROMBIN TIME: CPT

## 2022-06-28 ENCOUNTER — HOSPITAL ENCOUNTER (OUTPATIENT)
Dept: HOSPITAL 88 - DX | Age: 82
End: 2022-06-28
Attending: FAMILY MEDICINE
Payer: MEDICARE

## 2022-06-28 DIAGNOSIS — K21.9: ICD-10-CM

## 2022-06-28 DIAGNOSIS — J44.9: Primary | ICD-10-CM

## 2022-06-28 DIAGNOSIS — Z20.822: ICD-10-CM

## 2022-06-28 PROCEDURE — 0223U NFCT DS 22 TRGT SARS-COV-2: CPT

## 2022-06-28 PROCEDURE — 36415 COLL VENOUS BLD VENIPUNCTURE: CPT

## 2022-06-28 PROCEDURE — 74246 X-RAY XM UPR GI TRC 2CNTRST: CPT
